# Patient Record
Sex: FEMALE | Race: WHITE | NOT HISPANIC OR LATINO | Employment: OTHER | URBAN - METROPOLITAN AREA
[De-identification: names, ages, dates, MRNs, and addresses within clinical notes are randomized per-mention and may not be internally consistent; named-entity substitution may affect disease eponyms.]

---

## 2019-07-26 PROBLEM — R07.9 CHEST PAIN: Status: ACTIVE | Noted: 2019-07-26

## 2019-07-26 PROBLEM — M86.9 OSTEOMYELITIS OF STERNUM: Status: ACTIVE | Noted: 2019-07-12

## 2019-07-26 PROBLEM — M00.9 SEPTIC ARTHRITIS: Status: ACTIVE | Noted: 2019-07-26

## 2019-07-26 PROBLEM — L03.90 CELLULITIS: Status: ACTIVE | Noted: 2019-07-06

## 2019-07-26 PROBLEM — R78.81 BACTEREMIA DUE TO GRAM-POSITIVE BACTERIA: Status: ACTIVE | Noted: 2019-07-06

## 2019-07-26 PROBLEM — K21.9 GERD (GASTROESOPHAGEAL REFLUX DISEASE): Status: ACTIVE | Noted: 2019-07-05

## 2019-07-26 PROBLEM — Z86.0100 HISTORY OF COLON POLYPS: Status: ACTIVE | Noted: 2017-08-22

## 2019-07-26 PROBLEM — Z86.010 HISTORY OF COLON POLYPS: Status: ACTIVE | Noted: 2017-08-22

## 2019-10-18 PROBLEM — T14.8XXA WOUND INFECTION: Status: ACTIVE | Noted: 2019-09-13

## 2019-10-18 PROBLEM — S21.102A: Status: ACTIVE | Noted: 2019-08-12

## 2019-10-18 PROBLEM — L02.91 ABSCESS: Status: ACTIVE | Noted: 2019-07-06

## 2019-10-18 PROBLEM — L08.9 WOUND INFECTION: Status: ACTIVE | Noted: 2019-09-13

## 2020-09-23 PROBLEM — R11.0 NAUSEA: Status: ACTIVE | Noted: 2020-09-23

## 2021-05-12 ENCOUNTER — PATIENT MESSAGE (OUTPATIENT)
Dept: RESEARCH | Facility: HOSPITAL | Age: 56
End: 2021-05-12

## 2022-07-11 PROBLEM — R07.9 CHEST PAIN: Status: RESOLVED | Noted: 2019-07-26 | Resolved: 2022-07-11

## 2022-07-27 PROBLEM — L02.512 ABSCESS OF LEFT THUMB: Status: ACTIVE | Noted: 2022-07-27

## 2022-07-28 PROBLEM — R79.89 ELEVATED LFTS: Status: ACTIVE | Noted: 2022-07-28

## 2022-07-28 PROBLEM — R19.7 DIARRHEA: Status: ACTIVE | Noted: 2022-07-28

## 2022-07-28 PROBLEM — E87.1 HYPONATREMIA: Status: ACTIVE | Noted: 2022-07-28

## 2022-07-31 PROBLEM — E87.1 HYPONATREMIA: Status: RESOLVED | Noted: 2022-07-28 | Resolved: 2022-07-31

## 2023-01-18 ENCOUNTER — PATIENT OUTREACH (OUTPATIENT)
Dept: ADMINISTRATIVE | Facility: HOSPITAL | Age: 58
End: 2023-01-18

## 2023-03-07 ENCOUNTER — PATIENT OUTREACH (OUTPATIENT)
Dept: ADMINISTRATIVE | Facility: HOSPITAL | Age: 58
End: 2023-03-07

## 2023-03-07 DIAGNOSIS — E11.42 TYPE 2 DIABETES MELLITUS WITH DIABETIC POLYNEUROPATHY, WITH LONG-TERM CURRENT USE OF INSULIN: Primary | ICD-10-CM

## 2023-03-07 DIAGNOSIS — Z79.4 TYPE 2 DIABETES MELLITUS WITH DIABETIC POLYNEUROPATHY, WITH LONG-TERM CURRENT USE OF INSULIN: Primary | ICD-10-CM

## 2023-03-27 PROBLEM — R07.9 CHEST PAIN: Status: ACTIVE | Noted: 2023-03-27

## 2023-03-27 PROBLEM — I25.10 LAD STENOSIS: Status: ACTIVE | Noted: 2023-03-27

## 2023-06-14 PROBLEM — E11.9 DIABETES MELLITUS WITHOUT COMPLICATION: Status: ACTIVE | Noted: 2023-06-14

## 2023-07-18 ENCOUNTER — PES CALL (OUTPATIENT)
Dept: ADMINISTRATIVE | Facility: CLINIC | Age: 58
End: 2023-07-18

## 2023-10-12 ENCOUNTER — PATIENT OUTREACH (OUTPATIENT)
Dept: ADMINISTRATIVE | Facility: HOSPITAL | Age: 58
End: 2023-10-12

## 2023-12-22 PROBLEM — J10.1 INFLUENZA A: Status: ACTIVE | Noted: 2023-12-22

## 2023-12-22 PROBLEM — E11.10 DIABETIC KETOACIDOSIS WITHOUT COMA ASSOCIATED WITH TYPE 2 DIABETES MELLITUS: Status: ACTIVE | Noted: 2023-12-22

## 2023-12-23 PROBLEM — R11.2 INTRACTABLE NAUSEA AND VOMITING: Status: ACTIVE | Noted: 2023-12-23

## 2023-12-24 PROBLEM — E87.6 HYPOKALEMIA DUE TO EXCESSIVE GASTROINTESTINAL LOSS OF POTASSIUM: Status: ACTIVE | Noted: 2023-12-24

## 2023-12-25 PROBLEM — E83.42 HYPOMAGNESEMIA: Status: ACTIVE | Noted: 2023-12-25

## 2023-12-26 PROBLEM — E11.10 DIABETIC KETOACIDOSIS WITHOUT COMA ASSOCIATED WITH TYPE 2 DIABETES MELLITUS: Status: RESOLVED | Noted: 2023-12-22 | Resolved: 2023-12-26

## 2023-12-27 ENCOUNTER — PATIENT OUTREACH (OUTPATIENT)
Dept: ADMINISTRATIVE | Facility: CLINIC | Age: 58
End: 2023-12-27

## 2023-12-27 NOTE — PROGRESS NOTES
C3 nurse attempted to contact Fawn Lim for a TCC post hospital discharge follow up call. No answer. Left voicemail with callback information. The patient does not have a scheduled HOSFU appointment. Message sent to PCP staff for assistance with scheduling visit with patient.

## 2023-12-28 NOTE — PROGRESS NOTES
2nd attempt to make TCC Call. Left voicemail. Please call [phone] leave your first and last name and date of birth for Yoni. I will return your call.

## 2023-12-28 NOTE — PROGRESS NOTES
3rd attempt for TCC Call; Left voicemail. Please call [phone] please leave first and last name and  for Yoni. We will return your call.

## 2024-03-09 PROBLEM — E66.811 CLASS 1 OBESITY: Status: ACTIVE | Noted: 2024-03-09

## 2024-03-09 PROBLEM — E66.9 CLASS 1 OBESITY: Status: ACTIVE | Noted: 2024-03-09

## 2024-03-09 PROBLEM — E87.6 HYPOKALEMIA: Status: ACTIVE | Noted: 2024-03-09

## 2024-03-09 PROBLEM — N17.9 AKI (ACUTE KIDNEY INJURY): Status: ACTIVE | Noted: 2024-03-09

## 2024-03-09 PROBLEM — R10.84 GENERALIZED ABDOMINAL PAIN: Status: ACTIVE | Noted: 2024-03-09

## 2024-03-09 PROBLEM — R79.89 PSEUDOHYPONATREMIA: Status: ACTIVE | Noted: 2024-03-09

## 2024-03-09 PROBLEM — R78.81 BACTEREMIA DUE TO GRAM-POSITIVE BACTERIA: Status: RESOLVED | Noted: 2019-07-06 | Resolved: 2024-03-09

## 2024-03-10 PROBLEM — I16.0 HYPERTENSIVE URGENCY: Status: ACTIVE | Noted: 2024-03-10

## 2024-03-13 ENCOUNTER — PATIENT OUTREACH (OUTPATIENT)
Dept: ADMINISTRATIVE | Facility: CLINIC | Age: 59
End: 2024-03-13

## 2024-03-13 ENCOUNTER — PATIENT MESSAGE (OUTPATIENT)
Dept: ADMINISTRATIVE | Facility: CLINIC | Age: 59
End: 2024-03-13

## 2024-04-05 ENCOUNTER — PATIENT OUTREACH (OUTPATIENT)
Dept: ADMINISTRATIVE | Facility: HOSPITAL | Age: 59
End: 2024-04-05

## 2024-04-05 DIAGNOSIS — E11.42 TYPE 2 DIABETES MELLITUS WITH DIABETIC POLYNEUROPATHY, WITH LONG-TERM CURRENT USE OF INSULIN: Primary | ICD-10-CM

## 2024-04-05 DIAGNOSIS — Z79.4 TYPE 2 DIABETES MELLITUS WITH DIABETIC POLYNEUROPATHY, WITH LONG-TERM CURRENT USE OF INSULIN: Primary | ICD-10-CM

## 2024-04-17 PROBLEM — I70.0 AORTIC ATHEROSCLEROSIS: Status: ACTIVE | Noted: 2024-04-17

## 2024-05-12 PROBLEM — Z79.4 TYPE 2 DIABETES MELLITUS WITH HYPERGLYCEMIA, WITH LONG-TERM CURRENT USE OF INSULIN: Status: ACTIVE | Noted: 2024-05-12

## 2024-05-12 PROBLEM — E11.65 TYPE 2 DIABETES MELLITUS WITH HYPERGLYCEMIA, WITH LONG-TERM CURRENT USE OF INSULIN: Status: ACTIVE | Noted: 2024-05-12

## 2024-05-13 PROBLEM — R10.13 EPIGASTRIC ABDOMINAL PAIN: Status: ACTIVE | Noted: 2024-05-13

## 2024-05-14 PROBLEM — R10.13 EPIGASTRIC ABDOMINAL PAIN: Status: RESOLVED | Noted: 2024-05-13 | Resolved: 2024-05-14

## 2024-05-14 PROBLEM — Z79.4 TYPE 2 DIABETES MELLITUS WITH HYPERGLYCEMIA, WITH LONG-TERM CURRENT USE OF INSULIN: Status: RESOLVED | Noted: 2024-05-12 | Resolved: 2024-05-14

## 2024-05-14 PROBLEM — E11.65 TYPE 2 DIABETES MELLITUS WITH HYPERGLYCEMIA, WITH LONG-TERM CURRENT USE OF INSULIN: Status: RESOLVED | Noted: 2024-05-12 | Resolved: 2024-05-14

## 2024-05-16 PROBLEM — K74.00 FIBROSIS OF LIVER: Status: ACTIVE | Noted: 2024-05-16

## 2024-05-23 ENCOUNTER — PATIENT OUTREACH (OUTPATIENT)
Dept: ADMINISTRATIVE | Facility: CLINIC | Age: 59
End: 2024-05-23

## 2024-05-23 NOTE — PROGRESS NOTES
C3 nurse attempted to contact Fawn Lim  for a TCC post hospital discharge follow up call. No answer. Left voicemail with callback information. The patient does not have a scheduled HOSFU appointment. Message sent to PCP staff for assistance with scheduling visit with patient within 5- 7 days post discharge 05/20/2024.

## 2024-05-24 DIAGNOSIS — R11.2 INTRACTABLE NAUSEA AND VOMITING: Primary | ICD-10-CM

## 2024-06-03 PROBLEM — R19.7 DIARRHEA: Status: RESOLVED | Noted: 2022-07-28 | Resolved: 2024-06-03

## 2024-06-03 PROBLEM — R11.0 NAUSEA: Status: RESOLVED | Noted: 2020-09-23 | Resolved: 2024-06-03

## 2024-06-03 PROBLEM — N17.9 AKI (ACUTE KIDNEY INJURY): Status: RESOLVED | Noted: 2024-03-09 | Resolved: 2024-06-03

## 2024-06-10 PROBLEM — E11.10 DIABETIC KETOACIDOSIS WITHOUT COMA ASSOCIATED WITH TYPE 2 DIABETES MELLITUS: Status: RESOLVED | Noted: 2023-12-22 | Resolved: 2024-06-10

## 2024-07-25 PROBLEM — K74.69 OTHER CIRRHOSIS OF LIVER: Status: ACTIVE | Noted: 2024-05-16

## 2024-07-25 PROBLEM — R50.9 FEVER: Status: ACTIVE | Noted: 2024-07-25

## 2024-07-25 PROBLEM — N30.00 ACUTE CYSTITIS WITHOUT HEMATURIA: Status: ACTIVE | Noted: 2024-07-25

## 2024-07-29 PROBLEM — D69.6 THROMBOCYTOPENIA: Status: ACTIVE | Noted: 2024-07-29

## 2024-07-30 PROBLEM — R11.0 NAUSEA: Status: ACTIVE | Noted: 2024-07-30

## 2024-08-02 ENCOUNTER — PATIENT OUTREACH (OUTPATIENT)
Dept: ADMINISTRATIVE | Facility: CLINIC | Age: 59
End: 2024-08-02

## 2024-08-02 NOTE — PROGRESS NOTES
2nd attempt made to reach patient and patient's daughter, Nlely, for TCC call. Left voicemail please call [phone] leave first name, last name, and  and I will return your call.

## 2024-08-02 NOTE — PROGRESS NOTES
C3 nurse attempted to contact Fawn Lim and patient's daughter, Nelly,  for a TCC post hospital discharge follow up call. No answer. Left voicemail with callback information. The patient does not have a scheduled HOSFU appointment. Message sent to PCP staff for assistance with scheduling visit with patient.

## 2024-11-27 PROBLEM — E87.6 HYPOKALEMIA DUE TO EXCESSIVE GASTROINTESTINAL LOSS OF POTASSIUM: Status: RESOLVED | Noted: 2023-12-24 | Resolved: 2024-11-27

## 2024-11-27 PROBLEM — M00.9 SEPTIC ARTHRITIS: Status: RESOLVED | Noted: 2019-07-26 | Resolved: 2024-11-27

## 2024-11-27 PROBLEM — L02.512 ABSCESS OF LEFT THUMB: Status: RESOLVED | Noted: 2022-07-27 | Resolved: 2024-11-27

## 2024-11-27 PROBLEM — R11.2 INTRACTABLE NAUSEA AND VOMITING: Status: RESOLVED | Noted: 2023-12-23 | Resolved: 2024-11-27

## 2024-11-27 PROBLEM — T14.8XXA WOUND INFECTION: Status: RESOLVED | Noted: 2019-09-13 | Resolved: 2024-11-27

## 2024-11-27 PROBLEM — N30.00 ACUTE CYSTITIS WITHOUT HEMATURIA: Status: RESOLVED | Noted: 2024-07-25 | Resolved: 2024-11-27

## 2024-11-27 PROBLEM — Z86.0100 HISTORY OF COLON POLYPS: Status: RESOLVED | Noted: 2017-08-22 | Resolved: 2024-11-27

## 2024-11-27 PROBLEM — L02.91 ABSCESS: Status: RESOLVED | Noted: 2019-07-06 | Resolved: 2024-11-27

## 2024-11-27 PROBLEM — S21.102A: Status: RESOLVED | Noted: 2019-08-12 | Resolved: 2024-11-27

## 2024-11-27 PROBLEM — R79.89 ELEVATED LFTS: Status: RESOLVED | Noted: 2022-07-28 | Resolved: 2024-11-27

## 2024-11-27 PROBLEM — I16.0 HYPERTENSIVE URGENCY: Status: RESOLVED | Noted: 2024-03-10 | Resolved: 2024-11-27

## 2024-11-27 PROBLEM — M86.9 OSTEOMYELITIS OF STERNUM: Status: RESOLVED | Noted: 2019-07-12 | Resolved: 2024-11-27

## 2024-11-27 PROBLEM — R10.13 EPIGASTRIC PAIN: Status: ACTIVE | Noted: 2019-07-05

## 2024-11-27 PROBLEM — E83.42 HYPOMAGNESEMIA: Status: RESOLVED | Noted: 2023-12-25 | Resolved: 2024-11-27

## 2024-11-27 PROBLEM — R50.9 FEVER: Status: RESOLVED | Noted: 2024-07-25 | Resolved: 2024-11-27

## 2024-11-27 PROBLEM — L08.9 WOUND INFECTION: Status: RESOLVED | Noted: 2019-09-13 | Resolved: 2024-11-27

## 2024-11-27 PROBLEM — R07.9 CHEST PAIN: Status: RESOLVED | Noted: 2023-03-27 | Resolved: 2024-11-27

## 2024-11-27 PROBLEM — J10.1 INFLUENZA A: Status: RESOLVED | Noted: 2023-12-22 | Resolved: 2024-11-27

## 2024-11-27 PROBLEM — R79.89 PSEUDOHYPONATREMIA: Status: RESOLVED | Noted: 2024-03-09 | Resolved: 2024-11-27

## 2024-11-27 PROBLEM — E87.6 HYPOKALEMIA: Status: RESOLVED | Noted: 2024-03-09 | Resolved: 2024-11-27

## 2024-11-27 PROBLEM — R10.84 GENERALIZED ABDOMINAL PAIN: Status: RESOLVED | Noted: 2024-03-09 | Resolved: 2024-11-27

## 2025-07-14 ENCOUNTER — TELEPHONE (OUTPATIENT)
Dept: PHARMACY | Facility: CLINIC | Age: 60
End: 2025-07-14

## 2025-07-14 NOTE — TELEPHONE ENCOUNTER
[facility]/Population Health Chart Review & Patient Outreach Details For Medication Adherence Project    Reason for Outreach Encounter: 3rd Party payor non-compliance report ([employer], [employer], [employer], etc)  2.  Patient Outreach Method: Reviewed patient chart  and [employer] message  3.   Medication in question:    Hypertension Medications              amLODIPine (NORVASC) 10 MG tablet Take 1 tablet (10 mg total) by mouth once daily.    losartan (COZAAR) 50 MG tablet Take 1 tablet (50 mg total) by mouth once daily.                 LF 90 ds 2/25/25    4.  Reviewed and or Updates Made To: Patient Chart  5. Outreach Outcomes and/or actions taken: Sent inquiry to patient: Waiting for response  Additional Notes:

## 2025-07-16 ENCOUNTER — TELEPHONE (OUTPATIENT)
Dept: PHARMACY | Facility: CLINIC | Age: 60
End: 2025-07-16

## 2025-07-16 NOTE — TELEPHONE ENCOUNTER
[facility]/Population Health Chart Review & Patient Outreach Details For Medication Adherence Project    Reason for Outreach Encounter: 3rd Party payor non-compliance report ([employer], [employer], [employer], etc)  2.  Patient Outreach Method: Reviewed patient chart   3.   Medication in question:    Hyperlipidemia Medications              atorvastatin (LIPITOR) 40 MG tablet Take 1 tablet (40 mg total) by mouth once daily.                   ds 6/27/25    4.  Reviewed and or Updates Made To: Patient Chart  5. Outreach Outcomes and/or actions taken: Patient filled medication and is on track to be adherent  Additional Notes:

## 2025-08-22 ENCOUNTER — PATIENT OUTREACH (OUTPATIENT)
Dept: ADMINISTRATIVE | Facility: HOSPITAL | Age: 60
End: 2025-08-22